# Patient Record
Sex: FEMALE | Race: WHITE
[De-identification: names, ages, dates, MRNs, and addresses within clinical notes are randomized per-mention and may not be internally consistent; named-entity substitution may affect disease eponyms.]

---

## 2020-05-04 ENCOUNTER — HOSPITAL ENCOUNTER (EMERGENCY)
Dept: HOSPITAL 33 - ED | Age: 80
LOS: 1 days | Discharge: ANOTHER HOSPITAL | End: 2020-05-05
Payer: MEDICARE

## 2020-05-04 DIAGNOSIS — M26.220: ICD-10-CM

## 2020-05-04 DIAGNOSIS — S34.119A: ICD-10-CM

## 2020-05-04 DIAGNOSIS — K57.90: ICD-10-CM

## 2020-05-04 DIAGNOSIS — F41.9: ICD-10-CM

## 2020-05-04 DIAGNOSIS — D25.9: ICD-10-CM

## 2020-05-04 DIAGNOSIS — K86.2: Primary | ICD-10-CM

## 2020-05-04 DIAGNOSIS — D69.6: ICD-10-CM

## 2020-05-04 DIAGNOSIS — Z79.899: ICD-10-CM

## 2020-05-04 DIAGNOSIS — I10: ICD-10-CM

## 2020-05-04 DIAGNOSIS — D72.819: ICD-10-CM

## 2020-05-04 DIAGNOSIS — K21.9: ICD-10-CM

## 2020-05-04 DIAGNOSIS — M43.16: ICD-10-CM

## 2020-05-04 DIAGNOSIS — E78.00: ICD-10-CM

## 2020-05-04 DIAGNOSIS — Z85.72: ICD-10-CM

## 2020-05-04 LAB
ALBUMIN SERPL-MCNC: 4.2 G/DL (ref 3.5–5)
ALP SERPL-CCNC: 52 U/L (ref 38–126)
ALT SERPL-CCNC: 17 U/L (ref 0–35)
ANION GAP SERPL CALC-SCNC: 12.7 MEQ/L (ref 5–15)
AST SERPL QL: 25 U/L (ref 14–36)
BASOPHILS # BLD AUTO: 0.01 10*3/UL (ref 0–0.4)
BASOPHILS NFR BLD AUTO: 0.3 % (ref 0–0.4)
BILIRUB BLD-MCNC: 0.5 MG/DL (ref 0.2–1.3)
BUN SERPL-MCNC: 24 MG/DL (ref 7–17)
CALCIUM SPEC-MCNC: 9.8 MG/DL (ref 8.4–10.2)
CHLORIDE SERPL-SCNC: 102 MMOL/L (ref 98–107)
CO2 SERPL-SCNC: 26 MMOL/L (ref 22–30)
CREAT SERPL-MCNC: 1.24 MG/DL (ref 0.52–1.04)
EOSINOPHIL # BLD AUTO: 0.06 10*3/UL (ref 0–0.5)
GLUCOSE SERPL-MCNC: 110 MG/DL (ref 74–106)
GLUCOSE UR-MCNC: NEGATIVE MG/DL
HCT VFR BLD AUTO: 30.4 % (ref 35–47)
HGB BLD-MCNC: 10 GM/DL (ref 12–16)
LIPASE SERPL-CCNC: 108 U/L (ref 23–300)
LYMPHOCYTES # SPEC AUTO: 0.87 10*3/UL (ref 1–4.6)
MCH RBC QN AUTO: 29.3 PG (ref 26–32)
MCHC RBC AUTO-ENTMCNC: 32.9 G/DL (ref 32–36)
MONOCYTES # BLD AUTO: 0.4 10*3/UL (ref 0–1.3)
PLATELET # BLD AUTO: 80 K/MM3 (ref 150–450)
POTASSIUM SERPLBLD-SCNC: 4 MMOL/L (ref 3.5–5.1)
PROT SERPL-MCNC: 7.1 G/DL (ref 6.3–8.2)
PROT UR STRIP-MCNC: NEGATIVE MG/DL
RBC # BLD AUTO: 3.41 M/MM3 (ref 4.1–5.4)
SODIUM SERPL-SCNC: 137 MMOL/L (ref 137–145)
WBC # BLD AUTO: 3.9 K/MM3 (ref 4–10.5)
WBC #/AREA URNS HPF: (no result) /HPF (ref 0–5)

## 2020-05-04 PROCEDURE — 85025 COMPLETE CBC W/AUTO DIFF WBC: CPT

## 2020-05-04 PROCEDURE — 80053 COMPREHEN METABOLIC PANEL: CPT

## 2020-05-04 PROCEDURE — 36000 PLACE NEEDLE IN VEIN: CPT

## 2020-05-04 PROCEDURE — 83690 ASSAY OF LIPASE: CPT

## 2020-05-04 PROCEDURE — 36415 COLL VENOUS BLD VENIPUNCTURE: CPT

## 2020-05-04 PROCEDURE — 99285 EMERGENCY DEPT VISIT HI MDM: CPT

## 2020-05-04 PROCEDURE — 96375 TX/PRO/DX INJ NEW DRUG ADDON: CPT

## 2020-05-04 PROCEDURE — 84484 ASSAY OF TROPONIN QUANT: CPT

## 2020-05-04 PROCEDURE — 74177 CT ABD & PELVIS W/CONTRAST: CPT

## 2020-05-04 PROCEDURE — 96374 THER/PROPH/DIAG INJ IV PUSH: CPT

## 2020-05-04 PROCEDURE — 81001 URINALYSIS AUTO W/SCOPE: CPT

## 2020-05-04 PROCEDURE — 93005 ELECTROCARDIOGRAM TRACING: CPT

## 2020-05-04 NOTE — ERPHSYRPT
- History of Present Illness


Time Seen by Provider: 05/04/20 21:15


Historian: patient


Exam Limitations: no limitations


Patient Subjective Stated Complaint: pt to ER with complaints of abdominal pain 

x 3 weeks. pt states + nauea. pt with some upper epigastric pain.


Triage Nursing Assessment: pt A&Ox4. pt a little [place]. pt ambulatory. pt skin 

pwd.


Physician History: 





Patient is a 80-year-old female with a history of lymphoma presents to our ED 

with complaints of abdominal pain.  Abdominal pain has been present for 

approximately 3 weeks.  Patient has been mildly constipated however she gave 

herself an enema today and produced some stool.  This provided some relief.  No 

associated trauma.  No nausea or vomiting.  Symptoms are mild to moderate 

intensity.  No specific worsening or improving factors.  Patient voices no 

other complaints at this time.


Timing/Duration: week(s) (3 weeks)


Activities at Onset: none


Quality: aching


Abdominal Pain Onset Location: periumbilical


Pain Radiation: no radiation


Severity of Pain-Max: moderate


Severity of Pain-Current: moderate


Modifying Factors: Improves With: nothing


Associated Symptoms: denies symptoms


Previous symptoms: no prior history


Allergies/Adverse Reactions: 








latex Allergy (Verified 05/04/20 21:10)


 





Home Medications: 








Omeprazole 20 MG [Prilosec 20 mg] 20 mg PO DAILY 11/10/15 [History]


Aspirin [Aspirin EC] 81 mg PO DAILY 01/16/16 [History]


Carvedilol 12.5 mg*** [Coreg 12.5 mg***] 25 mg PO BID 01/16/16 [History]


Amlodipine Besylate 5 mg*** [Norvasc 5 mg***] 2.5 mg PO DAILY 05/04/20 [History]


Pioglitazone 30 mg*** [Actos 30 MG***] 30 mg PO DAILY 05/04/20 [History]


Pioglitazone 30 mg*** [Actos 30 MG***] 30 mg PO DAILY 05/04/20 [History]


Sacubitril/Valsartan [Entresto 49 mg-51 mg Tablet] 1 tab PO DAILY 05/04/20 [

History]


Sertraline HCl [Zoloft] 25 mg PO DAILY 05/04/20 [History]


glipiZIDE [Glipizide] 10 mg PO DAILY 05/04/20 [History]





Hx Tetanus, Diphtheria Vaccination/Date Given: Yes


Hx Influenza Vaccination/Date Given: Yes


Hx Pneumococcal Vaccination/Date Given: Yes


Immunizations Up to Date: Yes





Travel Risk





- International Travel


Have you traveled outside of the country in past 3 weeks: No


Have you or anyone close to you been diagnosed with or: No


Do your reside in a community with a known COVID-19 case?: Yes


If Yes where:: [place]





- Coronavirus Screening


Has patient experienced Coronavirus symptoms: No





- Review of Systems


Constitutional: No Symptoms, No Fever, No Chills


Eyes: No Symptoms


Ears, Nose, & Throat: No Symptoms


Respiratory: No Symptoms, No Cough, No Dyspnea


Cardiac: No Symptoms, No Chest Pain, No Edema, No Syncope


Abdominal/Gastrointestinal: Abdominal Pain, Constipation, No Nausea, No Vomiting

, No Diarrhea


Genitourinary Symptoms: No Symptoms, No Dysuria


Musculoskeletal: No Symptoms, No Back Pain, No Neck Pain


Skin: No Symptoms, No Rash


Neurological: No Symptoms, No Dizziness, No Focal Weakness, No Sensory Changes


Psychological: No Symptoms


Endocrine: No Symptoms


Immunological/Allergic: No Symptoms


All Other Systems: Reviewed and Negative





- Past Medical History


Pertinent Past Medical History: Yes


Neurological History: No Pertinent History


ENT History: No Pertinent History


Cardiac History: High Cholesterol, Hypertension


Respiratory History: Other


Endocrine Medical History: No Pertinent History


Musculoskeletal History: Arthritis


GI Medical History: GERD, Hernia


 History: No Pertinent History


Psycho-Social History: Anxiety


Female Reproductive Disorders: No Pertinent History


Other Medical History: hiatal hernia, non hodgkins lymphoma, wears o2 at night.

  benign hypertensive heart disease.  cardiomyopathy.  malignant neoplasm 

lymphoma





- Past Surgical History


Past Surgical History: Yes


Neuro Surgical History: No Pertinent History


Cardiac: Cardiac Catheterization


Respiratory: No Pertinent History


Gastrointestinal: Cholecystectomy


Genitourinary: No Pertinent History


Musculoskeletal: No Pertinent History


Female Surgical History: Tubal Ligation, Other


Other Surgical History: breast reduction





- Social History


Smoking Status: Never smoker


Exposure to second hand smoke: No


Drug Use: none


Patient Lives Alone: No





- Nursing Vital Signs


Nursing Vital Signs: 


 Initial Vital Signs











Temperature  98.3 F   05/04/20 21:01


 


Pulse Rate  77   05/04/20 21:01


 


Respiratory Rate  18   05/04/20 21:01


 


Blood Pressure  178/92   05/04/20 21:01


 


O2 Sat by Pulse Oximetry  98   05/04/20 21:01








 Pain Scale











Pain Intensity                 3

















- Physical Exam


General Appearance: no apparent distress, alert


Eye Exam: PERRL/EOMI, eyes nml inspection


Ears, Nose, Throat Exam: normal ENT inspection, pharynx normal, moist mucous 

membranes


Neck Exam: normal inspection, non-tender, supple, full range of motion


Respiratory Exam: normal breath sounds, lungs clear, No respiratory distress


Cardiovascular Exam: regular rate/rhythm, normal heart sounds


Gastrointestinal/Abdomen Exam: soft, tenderness, distention, No mass, No 

pulsatile mass, No rebound


Back Exam: normal inspection, normal range of motion, No CVA tenderness, No 

vertebral tenderness


Extremity Exam: normal inspection, normal range of motion, pelvis stable


Neurologic Exam: alert, oriented x 3, cooperative, normal mood/affect, nml 

cerebellar function, sensation nml, No motor deficits


Skin Exam: normal color, warm, dry


**SpO2 Interpretation**: normal


SpO2: 98


O2 Delivery: Room Air





- Course


Nursing assessment & vital signs reviewed: Yes


EKG Interpreted by Me: RATE, Other (EKG reveals a ventricular paced rhythm.)





- CT Exams


  ** Abdomen/Pelvis


CT Interpretation: Tele-radiologist Report (Colonic diverticulosis, pancreatic 

lesion, pancreatic cyst,)


Ordered Tests: 


 Active Orders 24 hr











 Category Date Time Status


 


 IV Insertion STAT Care  05/04/20 21:08 Active


 


 ABDOMEN AND PELVIS W CONTRAST [CT] Stat Exams  05/04/20 21:08 Taken


 


 CBC W DIFF Stat Lab  05/04/20 20:40 Completed


 


 CMP Stat Lab  05/04/20 20:40 Completed


 


 LIPASE Stat Lab  05/04/20 20:40 Completed


 


 TROPONIN Q3H Lab  05/04/20 20:40 Completed


 


 TROPONIN Q3H Lab  05/05/20 01:05 Completed


 


 TROPONIN Q3H Lab  05/05/20 03:15 Ordered


 


 TROPONIN Q3H Lab  05/05/20 06:15 Ordered


 


 TROPONIN Q3H Lab  05/05/20 09:15 Ordered


 


 UA W/RFX UR CULTURE Stat Lab  05/04/20 22:30 Completed








Medication Summary














Discontinued Medications














Generic Name Dose Route Start Last Admin





  Trade Name Freq  PRN Reason Stop Dose Admin


 


Al Hydrox/Mg Hydrox/Simethicone  Confirm  05/05/20 01:57  





  Maalox Es 30 Ml Unit Dose***  Administered  05/05/20 01:58  





  Dose   





  30 ml   





  .ROUTE   





  .STK-MED ONE   





     





     





     





     


 


Sodium Chloride  1,000 mls @ 999 mls/hr  05/04/20 21:08  05/04/20 21:50





  Sodium Chloride 0.9% 1000 Ml  IV  05/04/20 22:08  999 mls/hr





  .Q1H1M STA   Administration





     





     





     





     


 


Sodium Chloride  Confirm  05/04/20 21:47  





  Sodium Chloride 0.9% 1000 Ml  Administered  05/04/20 21:48  





  Dose   





  1,000 mls @ ud   





  .ROUTE   





  .STK-MED ONE   





     





     





     





     


 


Lidocaine HCl  Confirm  05/05/20 01:57  





  Xylocaine Hcl Viscous *  Administered  05/05/20 01:58  





  Dose   





  15 ml   





  .ROUTE   





  .STK-MED ONE   





     





     





     





     


 


Magnesium Hydroxide  45 ml  05/05/20 01:49  05/05/20 01:59





  Gi Cocktail 45 Ml (Maalox/Lidocaine)  PO  05/05/20 01:50  45 ml





  STAT ONE   Administration





     





     





     





     


 


Morphine Sulfate  2 mg  05/04/20 21:08  05/04/20 21:50





  Morphine Sulfate 2 Mg Inj***  IV  05/04/20 21:09  2 mg





  STAT ONE   Administration





     





     





     





     


 


Morphine Sulfate  Confirm  05/04/20 21:47  





  Morphine Sulfate 2 Mg Inj***  Administered  05/04/20 21:48  





  Dose   





  2 mg   





  .ROUTE   





  .STK-MED ONE   





     





     





     





     


 


Ondansetron HCl  4 mg  05/04/20 21:08  05/04/20 21:50





  Zofran 4 Mg/2 Ml Vial**  IV  05/04/20 21:09  4 mg





  STAT ONE   Administration





     





     





     





     


 


Ondansetron HCl  Confirm  05/04/20 21:47  





  Zofran 4 Mg/2 Ml Vial**  Administered  05/04/20 21:48  





  Dose   





  4 mg   





  .ROUTE   





  .STK-MED ONE   





     





     





     





     











Lab/Rad Data: 


 Laboratory Result Diagrams





 05/04/20 20:40 





 05/04/20 20:40 





 Laboratory Results











  05/05/20 05/04/20 05/04/20 Range/Units





  01:05 22:30 20:40 


 


WBC     (4.0-10.5)  K/mm3


 


RBC     (4.1-5.4)  M/mm3


 


Hgb     (12.0-16.0)  gm/dl


 


Hct     (35-47)  %


 


MCV     ()  fl


 


MCH     (26-32)  pg


 


MCHC     (32-36)  g/dl


 


RDW     (11.5-14.0)  %


 


Plt Count     (150-450)  K/mm3


 


MPV     (7.5-11.0)  fl


 


Gran %     (36.0-66.0)  %


 


Eos # (Auto)     (0-0.5)  


 


Absolute Lymphs (auto)     (1.0-4.6)  


 


Absolute Monos (auto)     (0.0-1.3)  


 


Lymphocytes %     (24.0-44.0)  %


 


Monocytes %     (0.0-12.0)  %


 


Eosinophils %     (0.00-5.0)  %


 


Basophils %     (0.0-0.4)  %


 


Absolute Granulocytes     (1.4-6.9)  


 


Basophils #     (0-0.4)  


 


Sodium     (137-145)  mmol/L


 


Potassium     (3.5-5.1)  mmol/L


 


Chloride     ()  mmol/L


 


Carbon Dioxide     (22-30)  mmol/L


 


Anion Gap     (5-15)  MEQ/L


 


BUN     (7-17)  mg/dL


 


Creatinine     (0.52-1.04)  mg/dL


 


Estimated GFR     ML/MIN


 


Glucose     ()  mg/dL


 


Calcium     (8.4-10.2)  mg/dL


 


Total Bilirubin     (0.2-1.3)  mg/dL


 


AST     (14-36)  U/L


 


ALT     (0-35)  U/L


 


Alkaline Phosphatase     ()  U/L


 


Troponin I  < 0.012   < 0.012  (0.000-0.034)  ng/mL


 


Serum Total Protein     (6.3-8.2)  g/dL


 


Albumin     (3.5-5.0)  g/dL


 


Lipase     ()  U/L


 


Urine Color   STRAW   (YELLOW)  


 


Urine Appearance   CLEAR   (CLEAR)  


 


Urine pH   6.0   (5-6)  


 


Ur Specific Gravity   1.008   (1.005-1.025)  


 


Urine Protein   NEGATIVE   (Negative)  


 


Urine Ketones   NEGATIVE   (NEGATIVE)  


 


Urine Blood   NEGATIVE   (0-5)  Jose R/ul


 


Urine Nitrite   NEGATIVE   (NEGATIVE)  


 


Urine Bilirubin   NEGATIVE   (NEGATIVE)  


 


Urine Urobilinogen   NEGATIVE   (0-1)  mg/dL


 


Ur Leukocyte Esterase   NEGATIVE   (NEGATIVE)  


 


Urine WBC (Auto)   3-5   (0-5)  /HPF


 


U Epithel Cells (Auto)   RARE   (FEW)  /HPF


 


Urine Bacteria (Auto)   RARE   (NEGATIVE)  /HPF


 


Urine Culture Reflexed   NO   (NO)  


 


Urine Glucose   NEGATIVE   (NEGATIVE)  mg/dL














  05/04/20 05/04/20 Range/Units





  20:40 20:40 


 


WBC   3.9 L  (4.0-10.5)  K/mm3


 


RBC   3.41 L  (4.1-5.4)  M/mm3


 


Hgb   10.0 L  (12.0-16.0)  gm/dl


 


Hct   30.4 L  (35-47)  %


 


MCV   89.1  ()  fl


 


MCH   29.3  (26-32)  pg


 


MCHC   32.9  (32-36)  g/dl


 


RDW   13.9  (11.5-14.0)  %


 


Plt Count   80 L  (150-450)  K/mm3


 


MPV   10.3  (7.5-11.0)  fl


 


Gran %   65.2  (36.0-66.0)  %


 


Eos # (Auto)   0.06  (0-0.5)  


 


Absolute Lymphs (auto)   0.87 L  (1.0-4.6)  


 


Absolute Monos (auto)   0.40  (0.0-1.3)  


 


Lymphocytes %   22.5 L  (24.0-44.0)  %


 


Monocytes %   10.4  (0.0-12.0)  %


 


Eosinophils %   1.6  (0.00-5.0)  %


 


Basophils %   0.3  (0.0-0.4)  %


 


Absolute Granulocytes   2.52  (1.4-6.9)  


 


Basophils #   0.01  (0-0.4)  


 


Sodium  137   (137-145)  mmol/L


 


Potassium  4.0   (3.5-5.1)  mmol/L


 


Chloride  102   ()  mmol/L


 


Carbon Dioxide  26   (22-30)  mmol/L


 


Anion Gap  12.7   (5-15)  MEQ/L


 


BUN  24 H   (7-17)  mg/dL


 


Creatinine  1.24 H   (0.52-1.04)  mg/dL


 


Estimated GFR  44.2   ML/MIN


 


Glucose  110 H   ()  mg/dL


 


Calcium  9.8   (8.4-10.2)  mg/dL


 


Total Bilirubin  0.50   (0.2-1.3)  mg/dL


 


AST  25   (14-36)  U/L


 


ALT  17   (0-35)  U/L


 


Alkaline Phosphatase  52   ()  U/L


 


Troponin I    (0.000-0.034)  ng/mL


 


Serum Total Protein  7.1   (6.3-8.2)  g/dL


 


Albumin  4.2   (3.5-5.0)  g/dL


 


Lipase  108   ()  U/L


 


Urine Color    (YELLOW)  


 


Urine Appearance    (CLEAR)  


 


Urine pH    (5-6)  


 


Ur Specific Gravity    (1.005-1.025)  


 


Urine Protein    (Negative)  


 


Urine Ketones    (NEGATIVE)  


 


Urine Blood    (0-5)  Jose R/ul


 


Urine Nitrite    (NEGATIVE)  


 


Urine Bilirubin    (NEGATIVE)  


 


Urine Urobilinogen    (0-1)  mg/dL


 


Ur Leukocyte Esterase    (NEGATIVE)  


 


Urine WBC (Auto)    (0-5)  /HPF


 


U Epithel Cells (Auto)    (FEW)  /HPF


 


Urine Bacteria (Auto)    (NEGATIVE)  /HPF


 


Urine Culture Reflexed    (NO)  


 


Urine Glucose    (NEGATIVE)  mg/dL














- Progress


Progress: improved


Progress Note: 





05/05/20 01:15


Patient reassessed.  No active abdominal pain.  Case discussed with patient's 

oncologist Dr. Wills who has no objections to patient's discharge.  Patient 

tolerated p.o.  CT abdomen pelvis negative for acute pathology.


05/05/20 04:21


Plan of care discussed with patient.  She agrees to transfer for further 

evaluation and treatment.





Discussed with Dr. Lozada who accepts transfer.  Case discussed with Dr. Bucio who accepts consultation for GI.


Discussed with Dr.: Haim (We will transfer patient to facility with 

gastroenterology.)


Counseled pt/family regarding: lab results, diagnosis, rad results





- Departure


Departure Disposition: Home, Transfer


Clinical Impression: 


 Pancreatic lesion, Pancreatic cyst, Diverticulosis, Uterine fibroid, 

Anterolisthesis, Lesion of vertebra, Leukopenia, Thrombocytopenia





Condition: Stable


Critical Care Time: No


Referrals: 


VONNIE HDEZ [Primary Care Provider] - 


Additional Instructions: 


Discharge/Care Plan





TARAN LE was seen on 05/05/20 in the Emergency Room. The patient 

was counseled regarding Diagnosis,Lab results, Imaging studies, need for follow 

up and when to return to the Emergency Room.





Prescriptions given:





Discharge Note





I have spoken with the patient and/or caregivers. I have explained the patient'

s condition, diagnosis and treatment plan based on the information available to 

me at this time. I have answered the patient's and/or caregiver's questions and 

addressed any concerns. The patient and/or caregivers have as good 

understanding of the patient's diagnosis, condition and treatment plan as can 

be expected at this point. The vital signs have been stable. The patient's 

condition is stable and appropriate for discharge from the emergency department.





The patient will pursue further outpatient evaluation with the primary care 

physician or other designated or consulting physician as outlined in the 

discharge instructions. The patient and/or caregivers are agreeable to this 

plan of care and follow-up instructions have been explained in detail. The 

patient and/or caregivers have received these instruction. The patient/and or 

caregivers are aware that any significant change in condition or worsening of 

symptoms should prompt an immediate return to this or the closest emergency 

department or call 911.

## 2020-05-05 VITALS — DIASTOLIC BLOOD PRESSURE: 96 MMHG | HEART RATE: 80 BPM | SYSTOLIC BLOOD PRESSURE: 190 MMHG

## 2020-05-05 VITALS — OXYGEN SATURATION: 98 %

## 2020-05-05 LAB — BLD SMEAR INTERP: YES

## 2020-05-05 NOTE — XRAY
Indication: Epigastric and periumbilical pain 3 weeks.  Nausea.



Multiple contiguous axial images obtained through the abdomen and pelvis

without contrast as ordered.



Comparison: Noncontrast exam September 30, 2019.



Lung bases remain clear.  Heart is not enlarged.



Noncontrasted stomach and bowel loops remain nonobstructed.  Normal appendix.

There is now mild diffuse scattered colonic fecal debris.  Stable sigmoid

diverticulosis without diverticulitis and cholecystectomy.  Body and tail of

the pancreas again demonstrates ductal prominence as seen on CT abdomen/pelvis

with contrast on February 28, 2019 exam.  No free fluid/air.  Spleen is now

enlarged measuring 13.5 cm in greatest dimension with left upper quadrant

splenic varices.



Remaining liver, pancreas, spleen, adrenal glands, kidneys, ureters, bladder,

and uterus appear unremarkable for noncontrast exam.  Stable mild aortoiliac

calcifications without AAA.



Osseous structures again demonstrates osteopenia, mild lumbar degenerative

spondylosis, and minimal grade 1 L4 spondylolisthesis.  No ventral or inguinal

hernias.



Impression:

1.  New mild fecal stasis without obstruction.  Stable sigmoid diverticulosis.

2.  New splenomegaly with stable splenic varices.

3.  Stable pancreatic duct prominence of uncertain etiology.  Pancreatic mass

not completely excluded.  CT abdomen or MRI with contrast exam may yield

further information.

3.  Stable osteopenia, degenerative spondylosis, and grade 1 L4

spondylolisthesis.



Comment: Preliminary interpretation was made by [facility].  No critical discrepancy.

## 2021-05-19 ENCOUNTER — HOSPITAL ENCOUNTER (EMERGENCY)
Dept: HOSPITAL 33 - ED | Age: 81
Discharge: ANOTHER HOSPITAL | End: 2021-05-19
Payer: MEDICARE

## 2021-05-19 VITALS — DIASTOLIC BLOOD PRESSURE: 103 MMHG | HEART RATE: 70 BPM | SYSTOLIC BLOOD PRESSURE: 200 MMHG

## 2021-05-19 VITALS — OXYGEN SATURATION: 99 %

## 2021-05-19 DIAGNOSIS — Z99.81: ICD-10-CM

## 2021-05-19 DIAGNOSIS — Z85.72: ICD-10-CM
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
## 2021-05-19 DIAGNOSIS — Z79.899: ICD-10-CM

## 2021-05-19 DIAGNOSIS — C80.1: ICD-10-CM

## 2021-05-19 DIAGNOSIS — R29.6: ICD-10-CM

## 2021-05-19 DIAGNOSIS — R55: ICD-10-CM

## 2021-05-19 DIAGNOSIS — C78.89: ICD-10-CM
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
## 2021-05-19 DIAGNOSIS — S00.83XA: Primary | ICD-10-CM
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
## 2021-05-19 LAB
ALBUMIN SERPL-MCNC: 4 G/DL (ref 3.5–5)
ALP SERPL-CCNC: 87 U/L (ref 38–126)
ALT SERPL-CCNC: 21 U/L (ref 0–35)
ANION GAP SERPL CALC-SCNC: 14.6 MEQ/L (ref 5–15)
APTT PPP: 24 SECONDS (ref 25.3–37)
AST SERPL QL: 34 U/L (ref 14–36)
BASOPHILS # BLD AUTO: 0.01 10*3/UL (ref 0–0.4)
BASOPHILS NFR BLD AUTO: 0.2 % (ref 0–0.4)
BILIRUB BLD-MCNC: 0.4 MG/DL (ref 0.2–1.3)
BUN SERPL-MCNC: 21 MG/DL (ref 7–17)
CALCIUM SPEC-MCNC: 8.7 MG/DL (ref 8.4–10.2)
CHLORIDE SERPL-SCNC: 101 MMOL/L (ref 98–107)
CO2 SERPL-SCNC: 21 MMOL/L (ref 22–30)
CREAT SERPL-MCNC: 1.13 MG/DL (ref 0.52–1.04)
EOSINOPHIL # BLD AUTO: 0.05 10*3/UL (ref 0–0.5)
GFR SERPLBLD BASED ON 1.73 SQ M-ARVRAT: 49.1 ML/MIN
GLUCOSE SERPL-MCNC: 253 MG/DL (ref 74–106)
GLUCOSE UR-MCNC: >=500 MG/DL
HCT VFR BLD AUTO: 22.7 % (ref 35–47)
HGB BLD-MCNC: 7.8 GM/DL (ref 12–16)
INR PPP: 1.04 (ref 0.8–3)
LYMPHOCYTES # SPEC AUTO: 0.4 10*3/UL (ref 1–4.6)
MCH RBC QN AUTO: 31 PG (ref 26–32)
MCHC RBC AUTO-ENTMCNC: 34.4 G/DL (ref 32–36)
MONOCYTES # BLD AUTO: 0.2 10*3/UL (ref 0–1.3)
PLATELET # BLD AUTO: 36 K/MM3 (ref 150–450)
POTASSIUM SERPLBLD-SCNC: 3.7 MMOL/L (ref 3.5–5.1)
PROT SERPL-MCNC: 6.3 G/DL (ref 6.3–8.2)
PROT UR STRIP-MCNC: 30 MG/DL
PROTHROMBIN TIME: 11.8 SECONDS (ref 9.95–12.35)
RBC # BLD AUTO: 2.52 M/MM3 (ref 4.1–5.4)
RBC #/AREA URNS HPF: (no result) /HPF (ref 0–2)
SODIUM SERPL-SCNC: 133 MMOL/L (ref 137–145)
WBC # BLD AUTO: 4.4 K/MM3 (ref 4–10.5)
WBC #/AREA URNS HPF: (no result) /HPF (ref 0–5)

## 2021-05-19 PROCEDURE — 96376 TX/PRO/DX INJ SAME DRUG ADON: CPT

## 2021-05-19 PROCEDURE — 87086 URINE CULTURE/COLONY COUNT: CPT

## 2021-05-19 PROCEDURE — 83605 ASSAY OF LACTIC ACID: CPT

## 2021-05-19 PROCEDURE — 81001 URINALYSIS AUTO W/SCOPE: CPT

## 2021-05-19 PROCEDURE — 96375 TX/PRO/DX INJ NEW DRUG ADDON: CPT

## 2021-05-19 PROCEDURE — 93005 ELECTROCARDIOGRAM TRACING: CPT

## 2021-05-19 PROCEDURE — 36415 COLL VENOUS BLD VENIPUNCTURE: CPT

## 2021-05-19 PROCEDURE — 84484 ASSAY OF TROPONIN QUANT: CPT

## 2021-05-19 PROCEDURE — 99285 EMERGENCY DEPT VISIT HI MDM: CPT

## 2021-05-19 PROCEDURE — 80053 COMPREHEN METABOLIC PANEL: CPT

## 2021-05-19 PROCEDURE — 85025 COMPLETE CBC W/AUTO DIFF WBC: CPT

## 2021-05-19 PROCEDURE — 36000 PLACE NEEDLE IN VEIN: CPT

## 2021-05-19 PROCEDURE — 85730 THROMBOPLASTIN TIME PARTIAL: CPT

## 2021-05-19 PROCEDURE — 51702 INSERT TEMP BLADDER CATH: CPT

## 2021-05-19 PROCEDURE — 96374 THER/PROPH/DIAG INJ IV PUSH: CPT

## 2021-05-19 PROCEDURE — 70450 CT HEAD/BRAIN W/O DYE: CPT

## 2021-05-19 PROCEDURE — 85610 PROTHROMBIN TIME: CPT

## 2021-05-19 NOTE — ERPHSYRPT
- History of Present Illness


Source: family


Exam Limitations: clinical condition


Patient Subjective Stated Complaint: per pt's family, she fell from standing and

hit the right side of her head. family was taking her to [facility] when she

became unresponsive.


Triage Nursing Assessment: pt responsive to voice. does not answer approp. p-t 

diaphoretice, skin warm. respirations nonlabored, o2 at 3l as per home. bruising

noted around eyes, family states from fall last saturday. hematoma to rt 

temporal area.


Physician History: 





80 yo wf w h/o metastatic pancreatic ca presents w altered LOC after falling and

hitting head on a door. Pt also has a h/o head injury due to a fall 1 week ago. 

Pt arrived minimally alert but with good airway and able to follow simple 

commands. Pt with good sats and hypertensive. She was rushed to CT where large L

subdural hematoma was seen on CT scan. Discussed Ct results w family and DNR 

status was obtained. Family does wish neurosurgical intervention if it would 

help her. Spoke w Dr. Roach(Neurosurgeon) at [facility] in conjunction w Dr. Wills(trauma) who accepted pt. Pt also accepted by ER physician.


Timing/Duration: today


Severity: severe


Baseline/Normal Cognition: poor alertness


Current Cognition: poor alertness


Associated Symptoms: confusion, nausea


Allergies/Adverse Reactions: 








latex Allergy (Verified 05/19/21 20:31)


   





Home Medications: 








Omeprazole 20 MG [Prilosec 20 mg] 20 mg PO DAILY 11/10/15 [History]


Aspirin [Aspirin EC] 81 mg PO DAILY 01/16/16 [History]


Carvedilol 12.5 mg*** [Coreg 12.5 mg***] 25 mg PO BID 01/16/16 [History]


Amlodipine Besylate 5 mg*** [Norvasc 5 mg***] 2.5 mg PO DAILY 05/04/20 [History]


Pioglitazone 30 mg*** [Actos 30 MG***] 30 mg PO DAILY 05/04/20 [History]


Pioglitazone 30 mg*** [Actos 30 MG***] 30 mg PO DAILY 05/04/20 [History]


Sacubitril/Valsartan [Entresto 49 mg-51 mg Tablet] 1 tab PO DAILY 05/04/20 

[History]


Sertraline HCl [Zoloft] 25 mg PO DAILY 05/04/20 [History]


glipiZIDE [Glipizide] 10 mg PO DAILY 05/04/20 [History]





Hx Tetanus, Diphtheria Vaccination/Date Given: Yes


Hx Influenza Vaccination/Date Given: Yes


Hx Pneumococcal Vaccination/Date Given: Yes


Immunizations Up to Date: Yes





Travel Risk





- International Travel


Have you traveled outside of the country in past 3 weeks: No





- Coronavirus Screening


Are you exhibiting any of the following symptoms?: No


Close contact with a COVID-19 positive Pt in past 14-21 Days: No





- Vaccine Status


Have you recieved a Covid-19 vaccination: Yes


: Moderna





- Vaccination Dates


Date of 2cond Vaccination (if applicable): may 2021





- Review of Systems


All Other Systems: Unable due to condition





- Past Medical History


Pertinent Past Medical History: Yes


Neurological History: No Pertinent History


ENT History: No Pertinent History


Cardiac History: High Cholesterol, Hypertension


Respiratory History: Other


Endocrine Medical History: No Pertinent History


Musculoskeletal History: Arthritis


GI Medical History: GERD, Hernia


 History: No Pertinent History


Psycho-Social History: Anxiety


Female Reproductive Disorders: No Pertinent History


Other Medical History: hiatal hernia, non hodgkins lymphoma, wears o2 at night. 

 benign hypertensive heart disease.  cardiomyopathy.  malignant neoplasm 

lymphoma.  stage 4 pancreatic ca- currently recieving treatments





- Past Surgical History


Past Surgical History: Yes


Neuro Surgical History: No Pertinent History


Cardiac: Cardiac Catheterization


Respiratory: No Pertinent History


Gastrointestinal: Cholecystectomy


Genitourinary: No Pertinent History


Musculoskeletal: No Pertinent History


Female Surgical History: Tubal Ligation, Other


Other Surgical History: breast reduction, port placement





- Social History


Smoking Status: Former smoker


Exposure to second hand smoke: No


Drug Use: none


Patient Lives Alone: No


Significant Family History: no pertinent family hx





- Nursing Vital Signs


Nursing Vital Signs: 


                               Initial Vital Signs











Pulse Rate  82   05/19/21 20:04


 


Respiratory Rate  16   05/19/21 20:04


 


Blood Pressure  197/107   05/19/21 20:04


 


O2 Sat by Pulse Oximetry  99   05/19/21 20:04








                                   Pain Scale











Pain Intensity                 8

















- [place] Coma Scale


Best Eye Response ([place]): (3) open to voice


Best Verbal Response (Servando): (4) confused conversation


Best Motor Response (Servando): (5) localizes to pain


Serv[place] Total: 12





- Physical Exam


General Appearance: lethargy


Eye Exam: bilateral eye: normal inspection, PERRL, EOMI


Ears, Nose, Throat Exam: normal ENT inspection, TMs normal, pharynx normal, 

moist mucous membranes


Neck Exam: normal inspection, non-tender, supple, No meningismus, No mass, No 

Brudzinski, No Kernig's


Respiratory: lungs clear, airway intact, No respiratory distress


Cardiovascular: regular rate/rhythm, normal heart sounds, normal peripheral 

pulses, No murmur


Gastrointestinal: soft, normal bowel sounds, No tenderness


Back Exam: normal inspection


Extremity Exam: normal inspection, pelvis stable


Mental Status: lethargy, other (Oriented to name/place)


CNs Exam: PERRL, No facial droop, No facial paresthesias, No facial weakness


Motor/Sensory: no motor deficit


DTR: bicep (R): 2+, bicep (L): 2+


Skin Exam: warm, dry


**SpO2 Interpretation**: normal


SpO2: 99


O2 Delivery: Oxymizer





- Course


Nursing assessment & vital signs reviewed: Yes





- CT Exams


  ** Head


CT Interpretation: Discussed w/radiologist (Large L acute to subacute subdural 

hematome w 1.2cm midline shift)


Ordered Tests: 


                               Active Orders 24 hr











 Category Date Time Status


 


 EKG-ER Only STAT Care  05/19/21 19:52 Completed


 


 IV Insertion STAT Care  05/19/21 20:09 Completed


 


 IV Insertion-2nd Peripheral STAT Care  05/19/21 20:41 Completed


 


 NPO (ED) STAT Care  05/19/21 19:50 Completed


 


 HEAD WITHOUT CONTRAST [CT] Stat Exams  05/19/21 19:48 Taken


 


 CBC W DIFF Stat Lab  05/19/21 19:50 Completed


 


 CMP Stat Lab  05/19/21 19:50 Completed


 


 CULTURE,URINE Stat Lab  05/19/21 20:30 Received


 


 Lactic Acid Stat Lab  05/19/21 19:50 Completed


 


 PROTIME WITH INR Stat Lab  05/19/21 19:50 Completed


 


 PTT Stat Lab  05/19/21 19:50 Completed


 


 TROPONIN Q3H Lab  05/19/21 19:50 Completed


 


 UA W/RFX UR CULTURE Stat Lab  05/19/21 20:04 Completed








Medication Summary














Discontinued Medications














Generic Name Dose Route Start Last Admin





  Trade Name Freq  PRN Reason Stop Dose Admin


 


Labetalol HCl  10 mg  05/19/21 20:07  05/19/21 20:26





  Trandate 20 Mg/4 Ml Syringe***  IV  05/19/21 20:08  10 mg





  STAT ONE   Administration


 


Labetalol HCl  Confirm  05/19/21 20:24 





  Trandate 20 Mg/4 Ml Syringe***  Administered  05/19/21 20:25 





  Dose  





  20 mg  





  IV  





  .STK-MED ONE  


 


Labetalol HCl  20 mg  05/19/21 20:43  05/19/21 20:44





  Trandate 20 Mg/4 Ml Syringe***  IV  05/19/21 20:44  20 mg





  STAT ONE   Administration


 


Labetalol HCl  Confirm  05/19/21 20:43 





  Trandate 20 Mg/4 Ml Syringe***  Administered  05/19/21 20:44 





  Dose  





  20 mg  





  IV  





  .STK-MED ONE  


 


Ondansetron HCl  4 mg  05/19/21 20:22  05/19/21 20:26





  Zofran 4 Mg/2 Ml Vial**  IV  05/19/21 20:23  4 mg





  STAT ONE   Administration


 


Ondansetron HCl  Confirm  05/19/21 20:24 





  Zofran 4 Mg/2 Ml Vial**  Administered  05/19/21 20:25 





  Dose  





  4 mg  





  .ROUTE  





  .STK-MED ONE  











Lab/Rad Data: 


                           Laboratory Result Diagrams





                                 05/19/21 19:50 





                                 05/19/21 19:50 





                               Laboratory Results











  05/19/21 05/19/21 05/19/21 Range/Units





  20:04 19:50 19:50 


 


WBC     (4.0-10.5)  K/mm3


 


RBC     (4.1-5.4)  M/mm3


 


Hgb     (12.0-16.0)  gm/dl


 


Hct     (35-47)  %


 


MCV     ()  fl


 


MCH     (26-32)  pg


 


MCHC     (32-36)  g/dl


 


RDW     (11.5-14.0)  %


 


Plt Count     (150-450)  K/mm3


 


MPV     (7.5-11.0)  fl


 


Gran %     (36.0-66.0)  %


 


Eos # (Auto)     (0-0.5)  


 


Absolute Lymphs (auto)     (1.0-4.6)  


 


Absolute Monos (auto)     (0.0-1.3)  


 


Lymphocytes %     (24.0-44.0)  %


 


Monocytes %     (0.0-12.0)  %


 


Eosinophils %     (0.00-5.0)  %


 


Basophils %     (0.0-0.4)  %


 


Absolute Granulocytes     (1.4-6.9)  


 


Basophils #     (0-0.4)  


 


PT    11.8  (9.95-12.35)  SECONDS


 


INR    1.04  (0.8-3.0)  


 


APTT    24.0 L  (25.3-37.0)  SECONDS


 


Sodium     (137-145)  mmol/L


 


Potassium     (3.5-5.1)  mmol/L


 


Chloride     ()  mmol/L


 


Carbon Dioxide     (22-30)  mmol/L


 


Anion Gap     (5-15)  MEQ/L


 


BUN     (7-17)  mg/dL


 


Creatinine     (0.52-1.04)  mg/dL


 


Estimated GFR     ML/MIN


 


Glucose     ()  mg/dL


 


Lactic Acid     (0.4-2.0)  


 


Calcium     (8.4-10.2)  mg/dL


 


Total Bilirubin     (0.2-1.3)  mg/dL


 


AST     (14-36)  U/L


 


ALT     (0-35)  U/L


 


Alkaline Phosphatase     ()  U/L


 


Troponin I   < 0.012   (0.000-0.034)  ng/mL


 


Serum Total Protein     (6.3-8.2)  g/dL


 


Albumin     (3.5-5.0)  g/dL


 


Urine Color  STRAW    (YELLOW)  


 


Urine Appearance  CLEAR    (CLEAR)  


 


Urine pH  7.0    (5-6)  


 


Ur Specific Gravity  1.008    (1.005-1.025)  


 


Urine Protein  30    (Negative)  


 


Urine Ketones  NEGATIVE    (NEGATIVE)  


 


Urine Blood  SMALL    (0-5)  Jose R/ul


 


Urine Nitrite  NEGATIVE    (NEGATIVE)  


 


Urine Bilirubin  NEGATIVE    (NEGATIVE)  


 


Urine Urobilinogen  NEGATIVE    (0-1)  mg/dL


 


Ur Leukocyte Esterase  SMALL    (NEGATIVE)  


 


Urine WBC (Auto)  6-10    (0-5)  /HPF


 


Urine RBC (Auto)  0-2    (0-2)  /HPF


 


U Epithel Cells (Auto)  NONE    (FEW)  /HPF


 


Urine Bacteria (Auto)  NONE    (NEGATIVE)  /HPF


 


Urine Culture Reflexed  ORDERED SEPARATELY    (NO)  


 


Urine Glucose  >=500    (NEGATIVE)  mg/dL














  05/19/21 05/19/21 05/19/21 Range/Units





  19:50 19:50 19:50 


 


WBC    4.4  (4.0-10.5)  K/mm3


 


RBC    2.52 L  (4.1-5.4)  M/mm3


 


Hgb    7.8 L  (12.0-16.0)  gm/dl


 


Hct    22.7 L  (35-47)  %


 


MCV    90.1  ()  fl


 


MCH    31.0  (26-32)  pg


 


MCHC    34.4  (32-36)  g/dl


 


RDW    14.9 H  (11.5-14.0)  %


 


Plt Count    36 L  (150-450)  K/mm3


 


MPV    11.3 H  (7.5-11.0)  fl


 


Gran %    85.2 H  (36.0-66.0)  %


 


Eos # (Auto)    0.05  (0-0.5)  


 


Absolute Lymphs (auto)    0.40 L  (1.0-4.6)  


 


Absolute Monos (auto)    0.20  (0.0-1.3)  


 


Lymphocytes %    9.0 L  (24.0-44.0)  %


 


Monocytes %    4.5  (0.0-12.0)  %


 


Eosinophils %    1.1  (0.00-5.0)  %


 


Basophils %    0.2  (0.0-0.4)  %


 


Absolute Granulocytes    3.76  (1.4-6.9)  


 


Basophils #    0.01  (0-0.4)  


 


PT     (9.95-12.35)  SECONDS


 


INR     (0.8-3.0)  


 


APTT     (25.3-37.0)  SECONDS


 


Sodium  133 L    (137-145)  mmol/L


 


Potassium  3.7    (3.5-5.1)  mmol/L


 


Chloride  101    ()  mmol/L


 


Carbon Dioxide  21 L    (22-30)  mmol/L


 


Anion Gap  14.6    (5-15)  MEQ/L


 


BUN  21 H    (7-17)  mg/dL


 


Creatinine  1.13 H    (0.52-1.04)  mg/dL


 


Estimated GFR  49.1    ML/MIN


 


Glucose  253 H    ()  mg/dL


 


Lactic Acid   1.3   (0.4-2.0)  


 


Calcium  8.7    (8.4-10.2)  mg/dL


 


Total Bilirubin  0.40    (0.2-1.3)  mg/dL


 


AST  34    (14-36)  U/L


 


ALT  21    (0-35)  U/L


 


Alkaline Phosphatase  87    ()  U/L


 


Troponin I     (0.000-0.034)  ng/mL


 


Serum Total Protein  6.3    (6.3-8.2)  g/dL


 


Albumin  4.0    (3.5-5.0)  g/dL


 


Urine Color     (YELLOW)  


 


Urine Appearance     (CLEAR)  


 


Urine pH     (5-6)  


 


Ur Specific Gravity     (1.005-1.025)  


 


Urine Protein     (Negative)  


 


Urine Ketones     (NEGATIVE)  


 


Urine Blood     (0-5)  Jose R/ul


 


Urine Nitrite     (NEGATIVE)  


 


Urine Bilirubin     (NEGATIVE)  


 


Urine Urobilinogen     (0-1)  mg/dL


 


Ur Leukocyte Esterase     (NEGATIVE)  


 


Urine WBC (Auto)     (0-5)  /HPF


 


Urine RBC (Auto)     (0-2)  /HPF


 


U Epithel Cells (Auto)     (FEW)  /HPF


 


Urine Bacteria (Auto)     (NEGATIVE)  /HPF


 


Urine Culture Reflexed     (NO)  


 


Urine Glucose     (NEGATIVE)  mg/dL














- Progress


Progress Note: 





05/20/21 00:49


Pt in critical but stable condition when EMS assumed care. DNR order obtained 

per /family. Family OK w neurosurgical intervention to drain subdural. 


Counseled pt/family regarding: diagnosis, rad results





- Departure


Departure Disposition: Transfer


Clinical Impression: 


 Subdural hematoma





Condition: Critical


Critical Care Time: Yes


Critical Care Time(excluding separately billable procedures): Critical 30-74 

mins


Referrals: 


VONNIE HDEZ [Primary Care Provider] -

## 2021-05-20 NOTE — XRAY
Indication: Temporal head injury/swelling following fall.



Multiple contiguous axial images obtained through the head without contrast.



Comparison: February 28, 2019.



New large acute to subacute appearing left temporoparietal subdural hematoma

extending along the entire convexity and up to 1.5 cm in thickness.  There is

also mass effect with 1.2 cm midline shifting.  Fourth ventricle is midline

without hydrocephalus.  Small high right parietal scalp hematoma.  Bony

calvarium intact.  Visualized paranasal sinuses and mastoid air cells are

clear.



Impression: New large left temporoparietal acute to subacute subdural hematoma

with mass effect/midline shifting.  Small right parietal scalp hematoma.



Comment: Immediate telephone report was given to ordering clinician, Dr. Fitzgerald at 2005 hrs. on May 19, 2021.